# Patient Record
Sex: MALE | NOT HISPANIC OR LATINO | URBAN - METROPOLITAN AREA
[De-identification: names, ages, dates, MRNs, and addresses within clinical notes are randomized per-mention and may not be internally consistent; named-entity substitution may affect disease eponyms.]

---

## 2019-05-15 ENCOUNTER — EMERGENCY (EMERGENCY)
Facility: HOSPITAL | Age: 79
LOS: 1 days | Discharge: HOME | End: 2019-05-15
Attending: EMERGENCY MEDICINE | Admitting: EMERGENCY MEDICINE
Payer: MEDICARE

## 2019-05-15 VITALS
OXYGEN SATURATION: 100 % | DIASTOLIC BLOOD PRESSURE: 79 MMHG | TEMPERATURE: 99 F | SYSTOLIC BLOOD PRESSURE: 182 MMHG | HEART RATE: 62 BPM | RESPIRATION RATE: 16 BRPM

## 2019-05-15 LAB
ALBUMIN SERPL ELPH-MCNC: 4.4 G/DL — SIGNIFICANT CHANGE UP (ref 3.3–5)
ALP SERPL-CCNC: 75 U/L — SIGNIFICANT CHANGE UP (ref 40–120)
ALT FLD-CCNC: 17 U/L — SIGNIFICANT CHANGE UP (ref 4–41)
ANION GAP SERPL CALC-SCNC: 8 MMO/L — SIGNIFICANT CHANGE UP (ref 7–14)
APTT BLD: 30.6 SEC — SIGNIFICANT CHANGE UP (ref 27.5–36.3)
AST SERPL-CCNC: 23 U/L — SIGNIFICANT CHANGE UP (ref 4–40)
BASOPHILS # BLD AUTO: 0.08 K/UL — SIGNIFICANT CHANGE UP (ref 0–0.2)
BASOPHILS NFR BLD AUTO: 0.9 % — SIGNIFICANT CHANGE UP (ref 0–2)
BILIRUB SERPL-MCNC: 0.9 MG/DL — SIGNIFICANT CHANGE UP (ref 0.2–1.2)
BUN SERPL-MCNC: 14 MG/DL — SIGNIFICANT CHANGE UP (ref 7–23)
CALCIUM SERPL-MCNC: 9.4 MG/DL — SIGNIFICANT CHANGE UP (ref 8.4–10.5)
CHLORIDE SERPL-SCNC: 101 MMOL/L — SIGNIFICANT CHANGE UP (ref 98–107)
CO2 SERPL-SCNC: 28 MMOL/L — SIGNIFICANT CHANGE UP (ref 22–31)
CREAT SERPL-MCNC: 1.07 MG/DL — SIGNIFICANT CHANGE UP (ref 0.5–1.3)
EOSINOPHIL # BLD AUTO: 0.17 K/UL — SIGNIFICANT CHANGE UP (ref 0–0.5)
EOSINOPHIL NFR BLD AUTO: 2 % — SIGNIFICANT CHANGE UP (ref 0–6)
GLUCOSE SERPL-MCNC: 116 MG/DL — HIGH (ref 70–99)
HBA1C BLD-MCNC: 5.7 % — HIGH (ref 4–5.6)
HCT VFR BLD CALC: 41.2 % — SIGNIFICANT CHANGE UP (ref 39–50)
HGB BLD-MCNC: 13.7 G/DL — SIGNIFICANT CHANGE UP (ref 13–17)
IMM GRANULOCYTES NFR BLD AUTO: 0.6 % — SIGNIFICANT CHANGE UP (ref 0–1.5)
INR BLD: 0.96 — SIGNIFICANT CHANGE UP (ref 0.88–1.17)
LYMPHOCYTES # BLD AUTO: 1.1 K/UL — SIGNIFICANT CHANGE UP (ref 1–3.3)
LYMPHOCYTES # BLD AUTO: 12.8 % — LOW (ref 13–44)
MCHC RBC-ENTMCNC: 30.8 PG — SIGNIFICANT CHANGE UP (ref 27–34)
MCHC RBC-ENTMCNC: 33.3 % — SIGNIFICANT CHANGE UP (ref 32–36)
MCV RBC AUTO: 92.6 FL — SIGNIFICANT CHANGE UP (ref 80–100)
MONOCYTES # BLD AUTO: 0.5 K/UL — SIGNIFICANT CHANGE UP (ref 0–0.9)
MONOCYTES NFR BLD AUTO: 5.8 % — SIGNIFICANT CHANGE UP (ref 2–14)
NEUTROPHILS # BLD AUTO: 6.67 K/UL — SIGNIFICANT CHANGE UP (ref 1.8–7.4)
NEUTROPHILS NFR BLD AUTO: 77.9 % — HIGH (ref 43–77)
NRBC # FLD: 0 K/UL — SIGNIFICANT CHANGE UP (ref 0–0)
PLATELET # BLD AUTO: 191 K/UL — SIGNIFICANT CHANGE UP (ref 150–400)
PMV BLD: 10.9 FL — SIGNIFICANT CHANGE UP (ref 7–13)
POTASSIUM SERPL-MCNC: 4.4 MMOL/L — SIGNIFICANT CHANGE UP (ref 3.5–5.3)
POTASSIUM SERPL-SCNC: 4.4 MMOL/L — SIGNIFICANT CHANGE UP (ref 3.5–5.3)
PROT SERPL-MCNC: 7.6 G/DL — SIGNIFICANT CHANGE UP (ref 6–8.3)
PROTHROM AB SERPL-ACNC: 10.6 SEC — SIGNIFICANT CHANGE UP (ref 9.8–13.1)
RBC # BLD: 4.45 M/UL — SIGNIFICANT CHANGE UP (ref 4.2–5.8)
RBC # FLD: 13 % — SIGNIFICANT CHANGE UP (ref 10.3–14.5)
SODIUM SERPL-SCNC: 137 MMOL/L — SIGNIFICANT CHANGE UP (ref 135–145)
TROPONIN T, HIGH SENSITIVITY: 10 NG/L — SIGNIFICANT CHANGE UP (ref ?–14)
TROPONIN T, HIGH SENSITIVITY: 11 NG/L — SIGNIFICANT CHANGE UP (ref ?–14)
WBC # BLD: 8.57 K/UL — SIGNIFICANT CHANGE UP (ref 3.8–10.5)
WBC # FLD AUTO: 8.57 K/UL — SIGNIFICANT CHANGE UP (ref 3.8–10.5)

## 2019-05-15 PROCEDURE — 99218: CPT | Mod: 25,GC

## 2019-05-15 PROCEDURE — 93010 ELECTROCARDIOGRAM REPORT: CPT | Mod: 59,GC

## 2019-05-15 PROCEDURE — 71046 X-RAY EXAM CHEST 2 VIEWS: CPT | Mod: 26

## 2019-05-15 PROCEDURE — 93306 TTE W/DOPPLER COMPLETE: CPT | Mod: 26

## 2019-05-15 PROCEDURE — 70450 CT HEAD/BRAIN W/O DYE: CPT | Mod: 26

## 2019-05-15 RX ORDER — ASPIRIN/CALCIUM CARB/MAGNESIUM 324 MG
162 TABLET ORAL ONCE
Refills: 0 | Status: COMPLETED | OUTPATIENT
Start: 2019-05-15 | End: 2019-05-15

## 2019-05-15 RX ORDER — ASPIRIN/CALCIUM CARB/MAGNESIUM 324 MG
81 TABLET ORAL DAILY
Refills: 0 | Status: DISCONTINUED | OUTPATIENT
Start: 2019-05-16 | End: 2019-05-19

## 2019-05-15 RX ORDER — SODIUM CHLORIDE 9 MG/ML
1000 INJECTION INTRAMUSCULAR; INTRAVENOUS; SUBCUTANEOUS ONCE
Refills: 0 | Status: COMPLETED | OUTPATIENT
Start: 2019-05-15 | End: 2019-05-15

## 2019-05-15 RX ADMIN — Medication 162 MILLIGRAM(S): at 10:34

## 2019-05-15 RX ADMIN — SODIUM CHLORIDE 2000 MILLILITER(S): 9 INJECTION INTRAMUSCULAR; INTRAVENOUS; SUBCUTANEOUS at 10:34

## 2019-05-15 NOTE — ED PROVIDER NOTE - ATTENDING CONTRIBUTION TO CARE
Dr Bloch, ATTENDING MD-  I performed a face to face bedside interview with patient regarding history of present illness, review of symptoms and past medical history. I completed an independent physical exam.  I have discussed patient's plan of care with PA.   Documentation as above in the note.   Patient well appearing NAD HEENT nml no JVD no carotid bruits, heart sounds nml lungs clear abd soft nontender, no bruising noted, motor and sensory intact

## 2019-05-15 NOTE — ED ADULT TRIAGE NOTE - CHIEF COMPLAINT QUOTE
Pt brought in by EMS from home complaining of dizziness, had a fall at home. Pt denies hitting his head, NO LOC. Pt also complaining of nausea vomiting. Pt denies chest pain, SOB, fever or chills.

## 2019-05-15 NOTE — ED PROVIDER NOTE - SKIN, MLM
Skin normal color for race, warm, dry and intact. No evidence of rash. No bruising. Skin normal color for race, warm, dry and intact. No evidence of rash.

## 2019-05-15 NOTE — ED CDU PROVIDER INITIAL DAY NOTE - OBJECTIVE STATEMENT
77 y/o male with no PMHx presents to the after syncopal episode this morning. Pt is a poor historian, unsure of events. Pt recalls he went to urinate this morning, fell into bathtub and was found by daughter-in-law who heard him fall and found him w/ consciousness in the bathtub. Denies chest pain, SOB, palpitations, headache, visual changes, dizziness, n/v, abdominal pain. Pt does not have a PMD and has not been seen by doctor in a long time. No other acute complaints at time of eval    CDU JOSÉ MIGUEL Campbell: HPI reviewed above. 78 y.o male no pmhx ( does not follow with PMD) coming in s/p syncopal episode around 7 am this morning . Recalls waking up to use the bathroom and then he does not remember what happened after that. STates his daughter in law was in the other room. Denies any preceding symptoms of chest pain, diaphoresis, sob , lightheadedness ,headaches or palpitations. In the ED, trops stable, CT head unremarkable. Sent to CDU for tele and echo for syncope. Pt has been resting comfortably without complaints. Denies fevers, chills, chest pain, sob, cough, n/v/d, abdominal pain, numbness, tingling, weakness, urinary symptoms.

## 2019-05-15 NOTE — ED PROVIDER NOTE - CLINICAL SUMMARY MEDICAL DECISION MAKING FREE TEXT BOX
78 YOM w/ syncope. Plan to CT head, chest x-ray, EKG, labs, cardiac enzyme, possible CDU for cardiac workup. A:  -78 YOM w/ syncope.   P:  -Labs, trops, CT head, chest x-ray, EKG, CDU

## 2019-05-15 NOTE — ED PROVIDER NOTE - OBJECTIVE STATEMENT
77 y/o male with no PMHx presents to the after being found down this morning. Pt is a poor historian, unsure of events. Pt recalls he went to urinate this morning, fell into bathtub and was found by daughter in law. Daughter in law is unsure of duration of LOC however she heard him fall and found him after he regained consciousness. Denies chest pain, SOB, headache, visual changes, dizziness, palpitations, n/v, abdominal pain. Pt has not PMD and has not been seen by doctor in a long time. No other acute complaints at time of eval. 77 y/o male with no PMHx presents to the after syncopal episode this morning. Pt is a poor historian, unsure of events. Pt recalls he went to urinate this morning, fell into bathtub and was found by daughter-in-law who heard him fall and found him w/ consciousness in the bathtub. Denies chest pain, SOB, palpitations, headache, visual changes, dizziness, n/v, abdominal pain. Pt does not have a PMD and has not been seen by doctor in a long time. No other acute complaints at time of eval.

## 2019-05-15 NOTE — ED ADULT NURSE NOTE - OBJECTIVE STATEMENT
Pt presents to ED after a syncopal episode at home. Pt states he got up to go to the bathroom this morning and his wife found him in the bathtub. Pt states loss of consiousness, pt does not know if he hit his head. pt denies anticoagulant use. Pt AxOx3. Pt denies chest pain, lightheadedness and dizziness. pt denies shortness of breath. Breathing even and unlabored. pt denies abd pain, n/v/d. Pt denies dysuria and hematuria. Skin clean dry and intact. 20G IVL placed in the left AC. Will continue to monitor.

## 2019-05-15 NOTE — ED CDU PROVIDER INITIAL DAY NOTE - ATTENDING CONTRIBUTION TO CARE
78 year old male with syncopal episode, no history of medical issues, but the patient reports urinary frequency. No complaints at this time and the pt feels well. He was moved to the observation area for further monitoring and testing. On exam rrr, lungs cta, abd soft, nt, no edema, no calf tenderness, cn ii-xii grossly intact, no drift, normal finger to nose, 5/5 strength.

## 2019-05-16 VITALS
DIASTOLIC BLOOD PRESSURE: 60 MMHG | SYSTOLIC BLOOD PRESSURE: 123 MMHG | HEART RATE: 59 BPM | OXYGEN SATURATION: 100 % | RESPIRATION RATE: 16 BRPM | TEMPERATURE: 98 F

## 2019-05-16 PROCEDURE — 99217: CPT | Mod: GC

## 2019-05-16 NOTE — ED CDU PROVIDER SUBSEQUENT DAY NOTE - PROGRESS NOTE DETAILS
Pt objectively noted to be resting comfortably in the interim; no issues or c/o thus far.  Pt. will be signed out to the day CDU PA (Cj) and attending (Dr. Palmer) at 0700 hrs.

## 2019-05-16 NOTE — ED CDU PROVIDER DISPOSITION NOTE - NSFOLLOWUPINSTRUCTIONS_ED_ALL_ED_FT
PLEASE MAKE SURE THAT YOU FOLLOW UP WITH CARDIOLOGIST AS DISCUSSED (THEY WILL JOSÉ MIGUEL YOUR WITH APPOINTMENT TIME), ALSO SCHEDULE AN APPOINTMENT TO START SEEING UROLOGIST AND PRIMARY CARE DOCTOR (CALL [phone]); TAKE BABY ASPIRIN 81MG DAILY. RETURN TO ER FOR WORSENING SYMPTOMS.

## 2019-05-16 NOTE — ED CDU PROVIDER DISPOSITION NOTE - ATTENDING CONTRIBUTION TO CARE
Seen and examined, have discussed plan of care with PA.   I agree with note as stated above, having amended the EMR and clinical course as needed to reflect the findings.

## 2019-05-16 NOTE — ED CDU PROVIDER SUBSEQUENT DAY NOTE - HISTORY
CDU Initial Note HPI: "...78 y.o male no pmhx ( does not follow with PMD) coming in s/p syncopal episode around 7 am this morning . Recalls waking up to use the bathroom and then he does not remember what happened after that. STates his daughter in law was in the other room. Denies any preceding symptoms of chest pain, diaphoresis, sob , lightheadedness ,headaches or palpitations. In the ED, trops stable, CT head unremarkable. Sent to CDU for tele and echo for syncope. Pt has been resting comfortably without complaints. Denies fevers, chills, chest pain, sob, cough, n/v/d, abdominal pain, numbness, tingling, weakness, urinary symptoms."  In the interim, pt objectively noted to be resting comfortably; no issues or c/o thus far.  No events noted on tele monitor.

## 2019-05-16 NOTE — ED CDU PROVIDER DISPOSITION NOTE - CLINICAL COURSE
Awake/Alert Pt is 79 y/o male with no significant PMhx innCDU  s/p syncopal episode yesterday, had uneventful CDU stay course (no cp, sob, palpitations, no additional syncopal episodes, no EKG changes, trop neg); Pt was seen and evalutaed by DR Terrazas from cardio, o/p f/u was arranged with [facility]; Off note - pt has been having bph-like sx for yrs, doesn't have pcp to f/u with, denies fever, chills, dysuria, frequency or urgency, provided f/u with  clinic as well as IM/family medicine clinic for PCP. Pt is 79 y/o male with no significant PMhx in CDU  s/p syncopal episode yesterday, had uneventful CDU stay on cardiac monitor (no cp, sob, palpitations, no additional syncopal episodes, no EKG changes, trop neg); Pt was seen and evalutaed by Dr. Terrazas from Cardiology, outpt. f/u was arranged with [facility]; Of note - pt has been having bph-like sx for yrs, doesn't have pcp to f/u with, denies fever, chills, dysuria, frequency or urgency, provided f/u with  clinic as well as IM/family medicine clinic for PCP.

## 2019-05-16 NOTE — ED CDU PROVIDER DISPOSITION NOTE - PLAN OF CARE
PLEASE MAKE SURE THAT YOU FOLLOW UP WITH CARDIOLOGIST AS DISCUSSED (THEY WILL JOSÉ MIGUEL YOUR WITH APPOINTMENT TIME), ALSO SCHEDULE AN APPOINTMENT TO START SEEING UROLOGIST AND PRIMARY CARE DOCTOR (CALL [phone]); TAKE BABY ASPIRIN 81MG DAILY. RETURN TO ER FOR WORSENING SYMPTOMS.

## 2019-05-16 NOTE — CONSULT NOTE ADULT - ATTENDING COMMENTS
Patient seen and examined.  Agree with above.   -Pt. presented to the ED following syncope - ? vasovagal  -TTE with normal LV function  -recommend outpatient event recorder  -pt. to follow up with [facility] after discharge    Michaela Terrazas MD

## 2019-05-16 NOTE — CONSULT NOTE ADULT - SUBJECTIVE AND OBJECTIVE BOX
HPI: 78 year old male with no PMH presented to ER after a syncopal episode.  Pt reported he went to urinate in the bathroom.  He reports straining to urinate and then fell into bathtub.  He does not recall events.  Family at bedside denies prior episodes of syncope or hx CAD/MI/CHF/Arryhtmias/CVA/unsteady gait.  He denies CP, SOB, dizziness, palps, fever, chills.      PAST MEDICAL & SURGICAL HISTORY:  No pertinent past medical history  No significant past surgical history      MEDICATIONS  (STANDING):  aspirin enteric coated 81 milliGRAM(s) Oral daily    Allergies  No Known Allergies    FAMILY HISTORY:  No pertinent family history in first degree relatives  Noncontributory for premature coronary disease or sudden cardiac death    SOCIAL HISTORY:    [x ] Non-smoker  [ ] Smoker  [ ] Alcohol      REVIEW OF SYSTEMS:  [ ]chest pain  [  ]shortness of breath  [  ]palpitations  [x  ]syncope  [ ]near syncope [ ]upper extremity weakness   [ ] lower extremity weakness  [  ]diplopia  [  ]altered mental status   [  ]fevers  [ ]chills [ ]nausea  [ ]vomitting  [  ]dysphagia    [ ]abdominal pain  [ ]melena  [ ]BRBPR    [  ]epistaxis  [  ]rash  [ ]lower extremity edema      [x ] All others negative	  [ ] Unable to obtain    PHYSICAL EXAM:  T(C): 36.7 (05-16-19 @ 10:00), Max: 36.7 (05-15-19 @ 18:01)  HR: 59 (05-16-19 @ 10:00) (59 - 76)  BP: 123/60 (05-16-19 @ 10:00) (123/60 - 154/72)  RR: 16 (05-16-19 @ 10:00) (16 - 20)  SpO2: 100% (05-16-19 @ 10:00) (97% - 100%)    Appearance: Normal	  HEENT:   Normal oral mucosa, PERRL, EOMI	  Lymphatic: No lymphadenopathy , no edema  Cardiovascular: Normal S1 S2, No JVD, No murmurs , Peripheral pulses palpable 2+ bilaterally  Respiratory: Lungs clear to auscultation, normal effort 	  Gastrointestinal:  Soft, Non-tender, + BS	  Skin: No rashes, No ecchymoses, No cyanosis, warm to touch  Musculoskeletal: Normal range of motion, normal strength    DIAGNOSTIC DATA:      ECG:  NSR, HR 65bpm, OH 180ms, QRS 94ms, QTC 424ms	    Echo: < from: Transthoracic Echocardiogram (05.15.19 @ 15:33) >  CONCLUSIONS:  1. Calcified trileaflet aortic valve with normal opening.  2. Normal left ventricular internal dimensions and wall  thicknesses.  3. Normal left ventricular systolic function. No segmental  wall motion abnormalities.  4. Normal right ventricular size and function.  ------------------------------------------------------------------------  Confirmed on  5/15/2019 - 16:30:27 by BOSSMAN Castellanos    < end of copied text >    < from: CT Head No Cont (05.15.19 @ 11:20) >  IMPRESSION:  No acute intracranial hemorrhage.  Microvascular disease.    < end of copied text >      	  LABS:	 	  Trop T 10--11                         13.7   8.57  )-----------( 191      ( 15 May 2019 10:43 )             41.2     05-15    137  |  101  |  14  ----------------------------<  116<H>  4.4   |  28  |  1.07    Ca    9.4      15 May 2019 10:43    TPro  7.6  /  Alb  4.4  /  TBili  0.9  /  DBili  x   /  AST  23  /  ALT  17  /  AlkPhos  75  05-15        ASSESSMENT/PLAN: 78 year old male with no PMH presented to ER after a syncopal episode.  Pt reported he went to urinate in the bathroom.  He reports straining to urinate and then fell into bathtub.      --pt susan with post micturition syncope  --ACS ruled out  --TTE with structurally normal heart  --needs OP establishment with PMD and Urology  --Can f/u in 1-2 weeks in my office [phone]

## 2019-06-17 PROBLEM — Z78.9 OTHER SPECIFIED HEALTH STATUS: Chronic | Status: ACTIVE | Noted: 2019-05-15

## 2019-06-24 PROBLEM — Z00.00 ENCOUNTER FOR PREVENTIVE HEALTH EXAMINATION: Status: ACTIVE | Noted: 2019-06-24

## 2019-07-02 ENCOUNTER — APPOINTMENT (OUTPATIENT)
Dept: UROLOGY | Facility: CLINIC | Age: 79
End: 2019-07-02
Payer: MEDICARE

## 2019-07-02 VITALS
BODY MASS INDEX: 23.23 KG/M2 | DIASTOLIC BLOOD PRESSURE: 80 MMHG | SYSTOLIC BLOOD PRESSURE: 144 MMHG | RESPIRATION RATE: 12 BRPM | HEART RATE: 66 BPM | WEIGHT: 148 LBS | OXYGEN SATURATION: 98 % | HEIGHT: 67 IN

## 2019-07-02 DIAGNOSIS — Z78.9 OTHER SPECIFIED HEALTH STATUS: ICD-10-CM

## 2019-07-02 PROCEDURE — 99204 OFFICE O/P NEW MOD 45 MIN: CPT | Mod: 25

## 2019-07-02 PROCEDURE — 51798 US URINE CAPACITY MEASURE: CPT

## 2019-07-02 NOTE — HISTORY OF PRESENT ILLNESS
[FreeTextEntry1] : cc bph\par pt is  yo male referred for bph . The patient reports frequency,  urgency occasional uui . He denies , incomplete emptying, intermittency, straining,  and weak stream. hematuria and dysuria  The patient states symptoms are of moderate severity. The symptoms have been present foryears . He reports a history of no complicating symptoms. He denies flank pain, gross hematuria, kidney stones and recurrent UTI.\par  Prior treatments include none . \par The is no family history of prostate cancer\par

## 2019-07-02 NOTE — PHYSICAL EXAM
[Normal Appearance] : normal appearance [General Appearance - Well Developed] : well developed [General Appearance - Well Nourished] : well nourished [General Appearance - In No Acute Distress] : no acute distress [Well Groomed] : well groomed [Edema] : no peripheral edema [Respiration, Rhythm And Depth] : normal respiratory rhythm and effort [Abdomen Tenderness] : non-tender [Exaggerated Use Of Accessory Muscles For Inspiration] : no accessory muscle use [Abdomen Soft] : soft [Costovertebral Angle Tenderness] : no ~M costovertebral angle tenderness [Urethral Meatus] : meatus normal [Scrotum] : the scrotum was normal [Urinary Bladder Findings] : the bladder was normal on palpation [Testes Mass (___cm)] : there were no testicular masses [No Prostate Nodules] : no prostate nodules [] : no rash [Normal Station and Gait] : the gait and station were normal for the patient's age [No Focal Deficits] : no focal deficits [Oriented To Time, Place, And Person] : oriented to person, place, and time [Mood] : the mood was normal [No Palpable Adenopathy] : no palpable adenopathy [Affect] : the affect was normal [Not Anxious] : not anxious

## 2019-07-02 NOTE — ASSESSMENT
[FreeTextEntry1] : We had a long discussion regarding the urinary symptoms and management options. Patient should try to restrict fluids a few hours before bedtime, elevate feet if there is any edema and avoid irritating drinks or foods such as coffee, tea, alcohol, spicy foods, etc. Medical management with alpha blockers (such as Flomax, Rapaflo), 5 alpha reductase inhibitors (such as Finasteride, Avodart), daily Cialis especially if there is erectile dysfunction, anticholinergic medications (such as Ditropan, Vesicare, Toviaz, Oxytrol patches) and Myrbetriq was reviewed. Surgical options discussed included photo-vaporization of the prostate also known as Greenlight laser, microwave thermotherapy, transurethral resection of the prostate (TURP) or suprapubic prostatectomy depending on the size of prostate. We also discussed intra-vesical injection of Botox for overactive bladder symptoms. Risks and benefits of each treatment were discussed included but were not limited to worsening urinary symptoms, incontinence, hematuria, erectile dysfunction, ED, retrograde ejaculation, etc. Also, workup with cystoscopy, post-void residual determination and urodynamics study and their indications were reviewed.\par mildly elevtaed pvr \par check uacx\par start tamsulosin\par side effects reviewed

## 2019-07-05 LAB
APPEARANCE: CLEAR
BACTERIA UR CULT: NORMAL
BACTERIA: NEGATIVE
BILIRUBIN URINE: NEGATIVE
BLOOD URINE: NEGATIVE
COLOR: NORMAL
GLUCOSE QUALITATIVE U: NEGATIVE
HYALINE CASTS: 0 /LPF
KETONES URINE: NEGATIVE
LEUKOCYTE ESTERASE URINE: NEGATIVE
MICROSCOPIC-UA: NORMAL
NITRITE URINE: NEGATIVE
PH URINE: 6
PROTEIN URINE: NEGATIVE
RED BLOOD CELLS URINE: 1 /HPF
SPECIFIC GRAVITY URINE: 1.01
SQUAMOUS EPITHELIAL CELLS: 0 /HPF
UROBILINOGEN URINE: NORMAL
WHITE BLOOD CELLS URINE: 0 /HPF

## 2019-10-08 ENCOUNTER — APPOINTMENT (OUTPATIENT)
Dept: UROLOGY | Facility: CLINIC | Age: 79
End: 2019-10-08
Payer: MEDICARE

## 2019-10-08 VITALS
SYSTOLIC BLOOD PRESSURE: 138 MMHG | HEART RATE: 64 BPM | BODY MASS INDEX: 23.23 KG/M2 | DIASTOLIC BLOOD PRESSURE: 74 MMHG | OXYGEN SATURATION: 98 % | WEIGHT: 148 LBS | HEIGHT: 67 IN | RESPIRATION RATE: 16 BRPM

## 2019-10-08 PROCEDURE — 99213 OFFICE O/P EST LOW 20 MIN: CPT

## 2019-10-08 NOTE — HISTORY OF PRESENT ILLNESS
[FreeTextEntry1] : cc bph\par pt is  yo male referred for bph . The patient reports frequency,  urgency occasional uui . He denies , incomplete emptying, intermittency, straining,  and weak stream. hematuria and dysuria  The patient states symptoms are of moderate severity. The symptoms have been present foryears . He reports a history of no complicating symptoms. He denies flank pain, gross hematuria, kidney stones and recurrent UTI.\par started on tamsulosin with improvement of symptoms

## 2020-12-09 RX ORDER — TAMSULOSIN HYDROCHLORIDE 0.4 MG/1
0.4 CAPSULE ORAL
Qty: 30 | Refills: 1 | Status: ACTIVE | COMMUNITY
Start: 2019-07-08 | End: 1900-01-01

## 2021-01-12 ENCOUNTER — APPOINTMENT (OUTPATIENT)
Dept: UROLOGY | Facility: CLINIC | Age: 81
End: 2021-01-12
Payer: MEDICARE

## 2021-01-12 DIAGNOSIS — N40.0 BENIGN PROSTATIC HYPERPLASIA WITHOUT LOWER URINARY TRACT SYMPMS: ICD-10-CM

## 2021-01-12 PROCEDURE — 99213 OFFICE O/P EST LOW 20 MIN: CPT | Mod: 95

## 2021-01-12 RX ORDER — TAMSULOSIN HYDROCHLORIDE 0.4 MG/1
0.4 CAPSULE ORAL
Qty: 90 | Refills: 3 | Status: ACTIVE | COMMUNITY
Start: 2019-10-08 | End: 1900-01-01

## 2021-01-19 NOTE — HISTORY OF PRESENT ILLNESS
[Home] : at home, [unfilled] , at the time of the visit. [Medical Office: ([facility])___] : at the medical office located in  [Family Member] : family member [Verbal consent obtained from patient] : the patient, [unfilled] [FreeTextEntry1] : The patient-doctor relationship has been established in a face to face fashion via real time video/audio HIPAA compliant communication using telemedicine software.  The patient's identity has been confirmed.  The patient was previously emailed a copy of the telemedicine consent.  They have had a chance to review and has now given verbal consent and has requested care to be assessed and treated through telemedicine and understands there maybe limitations in this process and they may need further follow up care in the office and or hospital settings.   \par  \par cc bph\par pt is yo male referred for bph . The patient reports frequency, urgency occasional uui . He denies , incomplete emptying, intermittency, straining, and weak stream. hematuria and dysuria The patient states symptoms are of moderate severity. The symptoms have been present foryears . He reports a history of no complicating symptoms. He denies flank pain, gross hematuria, kidney stones and recurrent UTI.\par started on tamsulosin with improvement of symptoms \par \par \par \par The patient denies fevers, chills, nausea and or vomiting and no unexplained weight loss. \par  \par All pertinent parts of the patient PFSH (past medical, family and social histories), laboratory, radiological studies and physician notes were reviewed prior to starting the face to face portion of the telemedicine visit.  Questionnaire results were discussed with patient. \par \par
